# Patient Record
(demographics unavailable — no encounter records)

---

## 2017-10-20 NOTE — EMERGENCY ROOM REPORT
History of Present Illness


General


Chief Complaint:  Earache


Source:  Patient, Family Member





Present Illness


HPI


 3 YO female presents to the ED c/o Left-sided ear pain 10 out of 10 in 

severity times one day.  Denies recent illness denies fevers reports chills.  

Mother states child is otherwise healthy denies cough states child did have 

some nasal congestion earlier this week that resolved on its own.  Child has 

been crying moderately ill the last few hours guarding pain in the left ear.  

Denies pain to the outside of the ear no discharge, bleeding and denies 

placement of foreign bodies. Denies, Listlessness, neck stiffness, increased 

lethargy, Labored breathing, uncontrollable high fevers.


Allergies:  


Coded Allergies:  


     No Known Allergies (Unverified , 10/20/17)





Patient History


Past Medical History:  see triage record


Past Surgical History:  none


Social History:  none


Pregnant Now:  No


Immunizations:  UTD


Reviewed Nursing Documentation:  PMH: Agreed, PSxH: Agreed





Nursing Documentation-PMH


Past Medical History:  No Stated History





Review of Systems


All Other Systems:  negative except mentioned in HPI





Physical Exam


Physical Exam





Vital Signs








  Date Time  Temp Pulse Resp B/P (MAP) Pulse Ox O2 Delivery O2 Flow Rate FiO2


 


10/20/17 16:45 99.1 139 25 151/92 100 Room Air  








Sp02 EP Interpretation:  reviewed, normal


General Appearance:  no apparent distress, alert, non-toxic, normal 

attentiveness for age, normal consolability


Eyes:  bilateral eye normal inspection, bilateral eye PERRL


ENT:  oropharynx normal, moist mucus membranes, no angioedema, no exudates, no 

erythma, other - no external ear ttp, the left tm is erythematous and bulging- 

canal is WNL, right TM and canal are WNL


Neck:  full ROM without pain


Respiratory:  effort normal, no rhonchi, no wheezing, no retractions, chest 

symmetric, speaking in full sentences


Gastrointestinal:  non tender


Neurologic:  oriented (for age), normal speech (for age)


Skin:  normal turgor





Medical Decision Making


PA Attestation


Dr. garay is my supervising Physician whom patient management has been 

discussed with.


Diagnostic Impression:  


 Primary Impression:  


 Otitis media in child


ER Course


 3 YO female presents to the ED c/o Left-sided ear pain 10 out of 10 in 

severity times one day.  Denies recent illness denies fevers reports chills.  

Mother states child is otherwise healthy denies cough states child did have 

some nasal congestion earlier this week that resolved on its own.  Child has 

been crying moderately ill the last few hours guarding pain in the left ear.  

Denies pain to the outside of the ear no discharge, bleeding and denies 

placement of foreign bodies. Denies, Listlessness, neck stiffness, increased 

lethargy, Labored breathing, uncontrollable high fevers. 





Ddx considered but are not limited to OM, OE, mastoiditis, TM perforation, FB





Vital signs: are WNL, pt. is afebrile


H&PE are most consistent with otitis media





ORDERS: 


-Tylenol PO 





-OTOSCOPY: Left TM is Erythematous and bulging





ED INTERVENTIONS: None required at this time. 





DISCHARGE: At this time pt. is stable for d/c to home. With PO ABX. Will 

provide printed patient care instructions, and any necessary prescriptions. 

Care plan and follow up instructions have been discussed with the patient prior 

to discharge. 


RX: Augmentin Suspension  BID x 10 days





Last Vital Signs








  Date Time  Temp Pulse Resp B/P (MAP) Pulse Ox O2 Delivery O2 Flow Rate FiO2


 


10/20/17 16:45 99.1 139 25 151/92 100 Room Air  








Disposition:  HOME, SELF-CARE


Condition:  Stable


Scripts


Acetaminophen (Children's Acetaminophen) 160 Mg/5 Ml Syringe


320 MG ORAL Q6H Y for Mild Pain/Temp > 100.5, #100 ML


   Prov: Tonie Escobar         10/20/17 


Amoxicillin/Potassium Clav Es-600 Suspension (AUGMENTIN ES-600 SUSPENSION) 600 

Mg/5 Ml Susp.recon


10 ML ORAL EVERY 12 HOURS for 10 Days, #100 ML


   Take with food & water


   Prov: Tonie Escobar         10/20/17


Departure Forms:  Return to School   Return to School On:  Oct 24, 2017


   School Release Restrictions:  None


      Return to Full Activity:  Oct 24, 2017


Patient Instructions:  Otitis Media, Child, Easy-to-Read





Additional Instructions:  


Take medications as directed. 


 ** Follow up with a Pediatrician in 3-5 days, even if your symptoms have 

resolved. ** 


--Please review list of primary care clinics, if you do not already have a 

primary care provider





Return sooner to ED if new symptoms occur, or current symptoms become worse. 











- Please note that this Emergency Department Report was dictated using [employer] technology software, occasionally this can lead to 

erroneous entry secondary to interpretation by the dictation equipment.











Tonie Escobar Oct 20, 2017 17:15

## 2018-01-28 NOTE — EMERGENCY ROOM REPORT
History of Present Illness


General


Chief Complaint:  Earache


Source:  Patient, Family Member





Present Illness


HPI


Patient presents with left ear pain that started at 2 AM.  Mom gave Motrin 

before coming in.  The pain is somewhat better.  She's had a history of otitis 

media in the past.  She's treated with Augmentin at that time.  There is no 

nausea vomiting diarrhea.  There is no rash.  Child has no cough.  The pain is 

rated at 10/10.  The child is in no distress.  Pressure, constant, not 

radiating.


Allergies:  


Coded Allergies:  


     No Known Allergies (Unverified , 1/28/18)





Patient History


Limited by:  age


Past Medical History:  see triage record


Social History:  home


Social History Narrative


with Mom


Reviewed Nursing Documentation:  PMH: Agreed, PSxH: Agreed





Nursing Documentation-PMH


Past Medical History:  No Stated History





Review of Systems


All Other Systems:  limited





Physical Exam


Physical Exam





Vital Signs








  Date Time  Temp Pulse Resp B/P (MAP) Pulse Ox O2 Delivery O2 Flow Rate FiO2


 


1/28/18 06:39 98.1 94 22 114/78 99 Room Air  








Sp02 EP Interpretation:  reviewed, normal


General Appearance:  no apparent distress, alert, non-toxic, normal 

attentiveness for age, normal consolability


Eyes:  bilateral eye normal inspection, bilateral eye PERRL


ENT:  nasal exam normal, oropharynx normal, moist mucus membranes, other - L TM 

with buldge and erythema, no drainage, R normal


Respiratory:  effort normal, no rhonchi, no wheezing, no retractions, chest 

symmetric, speaking in full sentences


Cardiovascular:  RRR


Gastrointestinal:  normal inspection


Musculoskeletal:  gait & station normal, digits & nails normal


Neurologic:  normal inspection


Psychiatric:  mood normal - smiling, no pain


Skin:  no rash





Medical Decision Making


Diagnostic Impression:  


 Primary Impression:  


 Left otitis media


ER Course


The patient presents with left ear pain.  Exam is consistent with otitis media.

  The child does not appear to be in pain at this time.  She was given Motrin 

before coming in.  Antibiotics are indicated.





The patient is stable for outpatient observation and treatment.





Last Vital Signs








  Date Time  Temp Pulse Resp B/P (MAP) Pulse Ox O2 Delivery O2 Flow Rate FiO2


 


1/28/18 06:47 98.1 94 22 114/78 (90)    


 


1/28/18 06:39     99 Room Air  








Status:  unchanged


Disposition:  HOME, SELF-CARE


Condition:  Stable


Scripts


Amoxicillin* (AMOXICILLIN*) 250 Mg/5 Ml Susp.recon


6 ML ORAL EVERY 8 HOURS, #150 ML


   Prov: Adam Soto M.D.         1/28/18











Adam Soto M.D. Jan 28, 2018 07:02